# Patient Record
Sex: MALE | Race: WHITE | Employment: STUDENT | URBAN - NONMETROPOLITAN AREA
[De-identification: names, ages, dates, MRNs, and addresses within clinical notes are randomized per-mention and may not be internally consistent; named-entity substitution may affect disease eponyms.]

---

## 2018-10-31 ENCOUNTER — OFFICE VISIT (OUTPATIENT)
Dept: FAMILY MEDICINE CLINIC | Age: 23
End: 2018-10-31
Payer: COMMERCIAL

## 2018-10-31 VITALS
TEMPERATURE: 100 F | WEIGHT: 170 LBS | BODY MASS INDEX: 24.74 KG/M2 | OXYGEN SATURATION: 96 % | DIASTOLIC BLOOD PRESSURE: 60 MMHG | SYSTOLIC BLOOD PRESSURE: 100 MMHG | RESPIRATION RATE: 16 BRPM | HEART RATE: 111 BPM

## 2018-10-31 DIAGNOSIS — J02.9 ACUTE PHARYNGITIS, UNSPECIFIED ETIOLOGY: Primary | ICD-10-CM

## 2018-10-31 LAB — S PYO AG THROAT QL: NORMAL

## 2018-10-31 PROCEDURE — 99202 OFFICE O/P NEW SF 15 MIN: CPT | Performed by: NURSE PRACTITIONER

## 2018-10-31 PROCEDURE — G8484 FLU IMMUNIZE NO ADMIN: HCPCS | Performed by: NURSE PRACTITIONER

## 2018-10-31 PROCEDURE — 4004F PT TOBACCO SCREEN RCVD TLK: CPT | Performed by: NURSE PRACTITIONER

## 2018-10-31 PROCEDURE — 87880 STREP A ASSAY W/OPTIC: CPT | Performed by: NURSE PRACTITIONER

## 2018-10-31 PROCEDURE — G8427 DOCREV CUR MEDS BY ELIG CLIN: HCPCS | Performed by: NURSE PRACTITIONER

## 2018-10-31 PROCEDURE — G8420 CALC BMI NORM PARAMETERS: HCPCS | Performed by: NURSE PRACTITIONER

## 2018-10-31 RX ORDER — DEXAMETHASONE 4 MG/1
8 TABLET ORAL ONCE
Qty: 2 TABLET | Refills: 0 | Status: SHIPPED | OUTPATIENT
Start: 2018-10-31 | End: 2018-10-31

## 2018-10-31 RX ORDER — CEPHALEXIN 500 MG/1
500 CAPSULE ORAL 3 TIMES DAILY
Qty: 30 CAPSULE | Refills: 0 | Status: SHIPPED | OUTPATIENT
Start: 2018-10-31 | End: 2018-11-10

## 2018-10-31 RX ORDER — IBUPROFEN 800 MG/1
800 TABLET ORAL EVERY 8 HOURS PRN
Qty: 90 TABLET | Refills: 0 | Status: SHIPPED | OUTPATIENT
Start: 2018-10-31

## 2018-10-31 ASSESSMENT — ENCOUNTER SYMPTOMS
SWOLLEN GLANDS: 1
SORE THROAT: 1

## 2021-08-11 DIAGNOSIS — A74.9 CHLAMYDIA: Primary | ICD-10-CM

## 2021-08-11 RX ORDER — AZITHROMYCIN 500 MG/1
1000 TABLET, FILM COATED ORAL DAILY
Qty: 2 TABLET | Refills: 0 | Status: SHIPPED | OUTPATIENT
Start: 2021-08-11 | End: 2021-08-12

## 2022-09-15 ENCOUNTER — HOSPITAL ENCOUNTER (EMERGENCY)
Age: 27
Discharge: HOME | End: 2022-09-15
Attending: EMERGENCY MEDICINE
Payer: MEDICAID

## 2022-09-15 ENCOUNTER — APPOINTMENT (OUTPATIENT)
Dept: GENERAL RADIOLOGY | Age: 27
End: 2022-09-15
Payer: MEDICAID

## 2022-09-15 VITALS
RESPIRATION RATE: 20 BRPM | OXYGEN SATURATION: 97 % | SYSTOLIC BLOOD PRESSURE: 132 MMHG | WEIGHT: 220 LBS | DIASTOLIC BLOOD PRESSURE: 58 MMHG | TEMPERATURE: 96.9 F | BODY MASS INDEX: 32.58 KG/M2 | HEART RATE: 82 BPM | HEIGHT: 69 IN

## 2022-09-15 DIAGNOSIS — J06.9 ACUTE UPPER RESPIRATORY INFECTION: ICD-10-CM

## 2022-09-15 DIAGNOSIS — J45.901 EXACERBATION OF ASTHMA, UNSPECIFIED ASTHMA SEVERITY, UNSPECIFIED WHETHER PERSISTENT: Primary | ICD-10-CM

## 2022-09-15 LAB
SARS-COV-2, RAPID: NOT DETECTED
SPECIMEN DESCRIPTION: NORMAL

## 2022-09-15 PROCEDURE — 94664 DEMO&/EVAL PT USE INHALER: CPT

## 2022-09-15 PROCEDURE — 99284 EMERGENCY DEPT VISIT MOD MDM: CPT

## 2022-09-15 PROCEDURE — 71045 X-RAY EXAM CHEST 1 VIEW: CPT

## 2022-09-15 PROCEDURE — 6370000000 HC RX 637 (ALT 250 FOR IP): Performed by: EMERGENCY MEDICINE

## 2022-09-15 PROCEDURE — C9803 HOPD COVID-19 SPEC COLLECT: HCPCS

## 2022-09-15 PROCEDURE — 87635 SARS-COV-2 COVID-19 AMP PRB: CPT

## 2022-09-15 RX ORDER — ONDANSETRON 4 MG/1
4 TABLET, ORALLY DISINTEGRATING ORAL EVERY 8 HOURS PRN
Qty: 10 TABLET | Refills: 0 | Status: SHIPPED | OUTPATIENT
Start: 2022-09-15

## 2022-09-15 RX ORDER — PREDNISONE 50 MG/1
50 TABLET ORAL DAILY
Qty: 4 TABLET | Refills: 0 | Status: SHIPPED | OUTPATIENT
Start: 2022-09-15 | End: 2022-09-20

## 2022-09-15 RX ORDER — IPRATROPIUM BROMIDE AND ALBUTEROL SULFATE 2.5; .5 MG/3ML; MG/3ML
1 SOLUTION RESPIRATORY (INHALATION) ONCE
Status: COMPLETED | OUTPATIENT
Start: 2022-09-15 | End: 2022-09-15

## 2022-09-15 RX ORDER — ALBUTEROL SULFATE 90 UG/1
2 AEROSOL, METERED RESPIRATORY (INHALATION) 4 TIMES DAILY PRN
Qty: 54 G | Refills: 1 | Status: SHIPPED | OUTPATIENT
Start: 2022-09-15

## 2022-09-15 RX ORDER — AZITHROMYCIN 250 MG/1
TABLET, FILM COATED ORAL
Qty: 1 PACKET | Refills: 0 | Status: SHIPPED | OUTPATIENT
Start: 2022-09-15 | End: 2022-09-19

## 2022-09-15 RX ADMIN — IPRATROPIUM BROMIDE AND ALBUTEROL SULFATE 1 AMPULE: 2.5; .5 SOLUTION RESPIRATORY (INHALATION) at 11:07

## 2022-09-15 NOTE — DISCHARGE INSTRUCTIONS
Tylenol as needed for any fevers. Try an over-the-counter antihistamine as needed for symptoms of allergies. Prednisone as directed until complete. Z-Jl as directed until complete. Use albuterol 2 puffs every 4 hours as needed for wheezing or bronchospasms. Follow-up with primary care provider soon as possible for routine evaluation.   Seek medical attention immediately for any acute concerns

## 2022-09-15 NOTE — Clinical Note
Montana Ahn was seen and treated in our emergency department on 9/15/2022. He may return to work on 09/19/2022. If you have any questions or concerns, please don't hesitate to call.       Damien Gary MD

## 2022-09-15 NOTE — ED PROVIDER NOTES
[facility] ED  EMERGENCY DEPARTMENT ENCOUNTER      Pt Name: Maya Russo  MRN: [id number]  [place] 1995  Date of evaluation: 9/15/2022  Provider: Ray Christianson MD    CHIEF COMPLAINT       Chief Complaint   Patient presents with    Cough     Productive, onset 1 week ago      Emesis     Onset 1-2 weeks ago. HISTORY OF PRESENT ILLNESS   (Location/Symptom, Timing/Onset, Context/Setting, Quality, Duration, Modifying Factors, Severity)  Note limiting factors. Maya Russo is a 28 y.o. male who presents to the emergency department      28year-old male presents emergency department for evaluation of shortness of breath and cough. Patient is also had a few episodes of vomiting after coughing spells. Have a fever a few days ago. No known sick contacts. Patient does have a history of asthma. Has not seen a primary care provider for quite some time so does not have an inhaler. He is complaining of nasal congestion and drainage as well. Cough has been productive of yellow sputum. He has had some nausea but has been triggered after coughing spells. Denies any abdominal pain. Has been vaccinated for COVID-19. Denies any other acute concerns. Nursing Notes were reviewed. REVIEW OF SYSTEMS    (2-9 systems for level 4, 10 or more for level 5)     Review of Systems   All other systems reviewed and are negative. Except as noted above the remainder of the review of systems was reviewed and negative.        PAST MEDICAL HISTORY     Past Medical History:   Diagnosis Date    Acne     ADHD (attention deficit hyperactivity disorder)     Asthma     History of pneumonia     History of seasonal allergies          SURGICAL HISTORY       Past Surgical History:   Procedure Laterality Date    APPENDECTOMY  2004    REL OF TONGUE TIE AND CLOSURE WITH FLAP           CURRENT MEDICATIONS       Discharge Medication List as of 9/15/2022 11:30 AM        CONTINUE these medications which have NOT CHANGED    Details   ibuprofen (ADVIL;MOTRIN) 800 MG tablet Take 1 tablet by mouth every 8 hours as needed for Pain (with food), Disp-90 tablet, R-0Normal      methylphenidate (CONCERTA) 36 MG extended release tablet Take 36 mg by mouth every morning. Historical Med             ALLERGIES     Patient has no known allergies. FAMILY HISTORY       Family History   Problem Relation Age of Onset    Diabetes Mother         GESTATIONAL    Cancer Maternal Grandmother         SKIN          SOCIAL HISTORY       Social History     Socioeconomic History    Marital status: Single     Spouse name: None    Number of children: None    Years of education: None    Highest education level: None   Tobacco Use    Smoking status: Every Day     Packs/day: 0.50     Types: Cigarettes    Smokeless tobacco: Current     Types: Chew   Vaping Use    Vaping Use: Some days   Substance and Sexual Activity    Alcohol use: No       SCREENINGS        Donny Coma Scale  Eye Opening: Spontaneous  Best Verbal Response: Oriented  Best Motor Response: Obeys commands  Donny Coma Scale Score: 15               PHYSICAL EXAM    (up to 7 for level 4, 8 or more for level 5)     ED Triage Vitals [09/15/22 0907]   BP Temp Temp Source Heart Rate Resp SpO2 Height Weight   117/71 96.9 °F (36.1 °C) Tympanic 82 -- 97 % 5' 9\" (1.753 m) 220 lb (99.8 kg)       Physical Exam  Vitals and nursing note reviewed. HENT:      Head: Normocephalic and atraumatic. Cardiovascular:      Rate and Rhythm: Normal rate and regular rhythm. Pulmonary:      Effort: Pulmonary effort is normal.      Comments: Poor air exchange. Diminished Breath sounds bilaterally. Diffuse wheezing. Abdominal:      General: There is no distension. Palpations: Abdomen is soft. Tenderness: There is no abdominal tenderness. Skin:     General: Skin is warm and dry. Findings: No rash. Neurological:      General: No focal deficit present.       Mental Status: He is alert and oriented to person, place, and time. DIAGNOSTIC RESULTS     EKG: All EKG's are interpreted by the Emergency Department Physician who either signs or Co-signs this chart in the absence of a cardiologist.        RADIOLOGY:   Non-plain film images such as CT, Ultrasound and MRI are read by the radiologist. Plain radiographic images are visualized and preliminarily interpreted by the emergency physician with the below findings:        Interpretation per the Radiologist below, if available at the time of this note:    XR CHEST PORTABLE   Final Result   No acute process. No focal consolidation. ED BEDSIDE ULTRASOUND:   Performed by ED Physician - none    LABS:  Labs Reviewed   COVID-19, RAPID       All other labs were within normal range or not returned as of this dictation. EMERGENCY DEPARTMENT COURSE and DIFFERENTIAL DIAGNOSIS/MDM:   Vitals:    Vitals:    09/15/22 0959 09/15/22 1029 09/15/22 1059 09/15/22 1129   BP: 110/60 115/69 133/81 (!) 132/58   Pulse:       Resp:       Temp:       TempSrc:       SpO2: 95% 98% 95% 97%   Weight:       Height:               MDM  Number of Diagnoses or Management Options  Acute upper respiratory infection  Exacerbation of asthma, unspecified asthma severity, unspecified whether persistent  Diagnosis management comments: 26-year-old male wheezing. Improved with treatment. Covid 19 rapid antigen negative. CXR unremarkable. Stable for discharge home. ED return and follow up discussed prior to discharge. [facility]       REASSESSMENT          CRITICAL CARE TIME   Total Critical Care time was  minutes, excluding separately reportable procedures. There was a high probability of clinically significant/life threatening deterioration in the patient's condition which required my urgent intervention. CONSULTS:  None    PROCEDURES:  Unless otherwise noted below, none     Procedures        FINAL IMPRESSION      1.  Exacerbation of asthma, unspecified asthma severity, unspecified whether persistent    2. Acute upper respiratory infection          DISPOSITION/PLAN   DISPOSITION Decision To Discharge 09/15/2022 11:26:48 AM      PATIENT REFERRED TO:  [address] [address]        DISCHARGE MEDICATIONS:  Discharge Medication List as of 9/15/2022 11:30 AM        START taking these medications    Details   albuterol sulfate HFA (VENTOLIN HFA) 108 (90 Base) MCG/ACT inhaler Inhale 2 puffs into the lungs 4 times daily as needed for Wheezing, Disp-54 g, R-1Normal      predniSONE (DELTASONE) 50 MG tablet Take 1 tablet by mouth daily for 5 days, Disp-4 tablet, R-0Normal      azithromycin (ZITHROMAX Z-PILY) 250 MG tablet Take 2 tablets (500 mg) on Day 1, and then take 1 tablet (250 mg) on days 2 through 5., Disp-1 packet, R-0Normal      ondansetron (ZOFRAN ODT) 4 MG disintegrating tablet Take 1 tablet by mouth every 8 hours as needed for Nausea or Vomiting, Disp-10 tablet, R-0Normal           Controlled Substances Monitoring:     No flowsheet data found.     (Please note that portions of this note were completed with a voice recognition program.  Efforts were made to edit the dictations but occasionally words are mis-transcribed.)    Valeriano Marley MD (electronically signed)  Attending Emergency Physician             Valeriano Marley MD  09/25/22 [phone]

## 2022-09-15 NOTE — ED NOTES
Patient given information on 2 offices taking new patients [id number] and Dr. Benny Black office for possible follow up.      Dorothy Espinoza RN  09/15/22 [id number]

## 2023-10-25 ENCOUNTER — HOSPITAL ENCOUNTER (EMERGENCY)
Age: 28
Discharge: HOME | End: 2023-10-26
Attending: EMERGENCY MEDICINE

## 2023-10-25 DIAGNOSIS — Z87.09 HISTORY OF ASTHMA: ICD-10-CM

## 2023-10-25 DIAGNOSIS — R11.2 NAUSEA AND VOMITING IN ADULT: Primary | ICD-10-CM

## 2023-10-25 DIAGNOSIS — Z86.59 HISTORY OF ADHD: ICD-10-CM

## 2023-10-25 PROCEDURE — 96375 TX/PRO/DX INJ NEW DRUG ADDON: CPT

## 2023-10-25 PROCEDURE — 96374 THER/PROPH/DIAG INJ IV PUSH: CPT

## 2023-10-25 PROCEDURE — 99284 EMERGENCY DEPT VISIT MOD MDM: CPT

## 2023-10-25 RX ORDER — 0.9 % SODIUM CHLORIDE 0.9 %
1000 INTRAVENOUS SOLUTION INTRAVENOUS ONCE
Status: COMPLETED | OUTPATIENT
Start: 2023-10-26 | End: 2023-10-26

## 2023-10-25 RX ORDER — ONDANSETRON 2 MG/ML
4 INJECTION INTRAMUSCULAR; INTRAVENOUS ONCE
Status: COMPLETED | OUTPATIENT
Start: 2023-10-26 | End: 2023-10-26

## 2023-10-25 ASSESSMENT — PAIN SCALES - GENERAL: PAINLEVEL_OUTOF10: 6

## 2023-10-25 ASSESSMENT — PAIN DESCRIPTION - PAIN TYPE: TYPE: ACUTE PAIN

## 2023-10-25 ASSESSMENT — PAIN DESCRIPTION - LOCATION: LOCATION: ABDOMEN

## 2023-10-25 ASSESSMENT — PAIN DESCRIPTION - DESCRIPTORS: DESCRIPTORS: DISCOMFORT

## 2023-10-25 ASSESSMENT — PAIN DESCRIPTION - FREQUENCY: FREQUENCY: INTERMITTENT

## 2023-10-25 ASSESSMENT — PAIN - FUNCTIONAL ASSESSMENT: PAIN_FUNCTIONAL_ASSESSMENT: 0-10

## 2023-10-25 ASSESSMENT — PAIN DESCRIPTION - ORIENTATION: ORIENTATION: RIGHT;LOWER;UPPER

## 2023-10-26 VITALS
SYSTOLIC BLOOD PRESSURE: 105 MMHG | HEIGHT: 69 IN | DIASTOLIC BLOOD PRESSURE: 62 MMHG | TEMPERATURE: 98.1 F | OXYGEN SATURATION: 94 % | WEIGHT: 240 LBS | HEART RATE: 96 BPM | RESPIRATION RATE: 20 BRPM | BODY MASS INDEX: 35.55 KG/M2

## 2023-10-26 LAB
ALBUMIN SERPL-MCNC: 3.7 G/DL (ref 3.5–5.2)
ALBUMIN/GLOB SERPL: 1.9 {RATIO} (ref 1–2.5)
ALP SERPL-CCNC: 64 U/L (ref 40–129)
ALT SERPL-CCNC: 22 U/L (ref 5–41)
ANION GAP SERPL CALCULATED.3IONS-SCNC: 9 MMOL/L (ref 9–17)
AST SERPL-CCNC: 18 U/L
BASOPHILS # BLD: 0.04 K/UL (ref 0–0.2)
BASOPHILS NFR BLD: 1 % (ref 0–2)
BILIRUB DIRECT SERPL-MCNC: <0.1 MG/DL
BILIRUB INDIRECT SERPL-MCNC: ABNORMAL MG/DL (ref 0–1)
BILIRUB SERPL-MCNC: 0.3 MG/DL (ref 0.3–1.2)
BUN SERPL-MCNC: 17 MG/DL (ref 6–20)
BUN/CREAT SERPL: 13 (ref 9–20)
CALCIUM SERPL-MCNC: 8.9 MG/DL (ref 8.6–10.4)
CHLORIDE SERPL-SCNC: 105 MMOL/L (ref 98–107)
CO2 SERPL-SCNC: 27 MMOL/L (ref 20–31)
CREAT SERPL-MCNC: 1.3 MG/DL (ref 0.7–1.2)
EOSINOPHIL # BLD: 0.27 K/UL (ref 0–0.44)
EOSINOPHILS RELATIVE PERCENT: 4 % (ref 1–4)
ERYTHROCYTE [DISTWIDTH] IN BLOOD BY AUTOMATED COUNT: 12.5 % (ref 11.8–14.4)
GFR SERPL CREATININE-BSD FRML MDRD: >60 ML/MIN/1.73M2
GLUCOSE SERPL-MCNC: 114 MG/DL (ref 70–99)
HCT VFR BLD AUTO: 45.3 % (ref 40.7–50.3)
HGB BLD-MCNC: 15.5 G/DL (ref 13–17)
IMM GRANULOCYTES # BLD AUTO: <0.03 K/UL (ref 0–0.3)
IMM GRANULOCYTES NFR BLD: 0 %
LIPASE SERPL-CCNC: 65 U/L (ref 13–60)
LYMPHOCYTES NFR BLD: 1.9 K/UL (ref 1.1–3.7)
LYMPHOCYTES RELATIVE PERCENT: 26 % (ref 24–43)
MCH RBC QN AUTO: 30.3 PG (ref 25.2–33.5)
MCHC RBC AUTO-ENTMCNC: 34.2 G/DL (ref 28.4–34.8)
MCV RBC AUTO: 88.6 FL (ref 82.6–102.9)
MONOCYTES NFR BLD: 0.48 K/UL (ref 0.1–1.2)
MONOCYTES NFR BLD: 7 % (ref 3–12)
NEUTROPHILS NFR BLD: 62 % (ref 36–65)
NEUTS SEG NFR BLD: 4.58 K/UL (ref 1.5–8.1)
NRBC BLD-RTO: 0 PER 100 WBC
PLATELET # BLD AUTO: 200 K/UL (ref 138–453)
PMV BLD AUTO: 10 FL (ref 8.1–13.5)
POTASSIUM SERPL-SCNC: 3.6 MMOL/L (ref 3.7–5.3)
PROT SERPL-MCNC: 5.7 G/DL (ref 6.4–8.3)
RBC # BLD AUTO: 5.11 M/UL (ref 4.21–5.77)
SODIUM SERPL-SCNC: 141 MMOL/L (ref 135–144)
WBC OTHER # BLD: 7.3 K/UL (ref 3.5–11.3)

## 2023-10-26 PROCEDURE — 2580000003 HC RX 258: Performed by: EMERGENCY MEDICINE

## 2023-10-26 PROCEDURE — 80076 HEPATIC FUNCTION PANEL: CPT

## 2023-10-26 PROCEDURE — 80048 BASIC METABOLIC PNL TOTAL CA: CPT

## 2023-10-26 PROCEDURE — 83690 ASSAY OF LIPASE: CPT

## 2023-10-26 PROCEDURE — 6360000002 HC RX W HCPCS: Performed by: EMERGENCY MEDICINE

## 2023-10-26 PROCEDURE — 36415 COLL VENOUS BLD VENIPUNCTURE: CPT

## 2023-10-26 PROCEDURE — 96375 TX/PRO/DX INJ NEW DRUG ADDON: CPT

## 2023-10-26 PROCEDURE — 2500000003 HC RX 250 WO HCPCS: Performed by: EMERGENCY MEDICINE

## 2023-10-26 PROCEDURE — A4216 STERILE WATER/SALINE, 10 ML: HCPCS | Performed by: EMERGENCY MEDICINE

## 2023-10-26 PROCEDURE — 85025 COMPLETE CBC W/AUTO DIFF WBC: CPT

## 2023-10-26 PROCEDURE — 96374 THER/PROPH/DIAG INJ IV PUSH: CPT

## 2023-10-26 RX ORDER — FAMOTIDINE 20 MG/1
20 TABLET, FILM COATED ORAL 2 TIMES DAILY
Qty: 60 TABLET | Refills: 0 | Status: SHIPPED | OUTPATIENT
Start: 2023-10-26

## 2023-10-26 RX ORDER — ONDANSETRON 4 MG/1
4 TABLET, ORALLY DISINTEGRATING ORAL 3 TIMES DAILY PRN
Qty: 21 TABLET | Refills: 0 | Status: SHIPPED | OUTPATIENT
Start: 2023-10-26

## 2023-10-26 RX ADMIN — SODIUM CHLORIDE 1000 ML: 9 INJECTION, SOLUTION INTRAVENOUS at 00:22

## 2023-10-26 RX ADMIN — FAMOTIDINE 20 MG: 10 INJECTION, SOLUTION INTRAVENOUS at 00:24

## 2023-10-26 RX ADMIN — ONDANSETRON 4 MG: 2 INJECTION INTRAMUSCULAR; INTRAVENOUS at 00:23

## 2023-10-26 ASSESSMENT — ENCOUNTER SYMPTOMS
VOMITING: 1
SORE THROAT: 0
DIARRHEA: 1
ABDOMINAL PAIN: 1
COUGH: 0
NAUSEA: 1
COLOR CHANGE: 0
SHORTNESS OF BREATH: 0

## 2023-10-26 NOTE — ED PROVIDER NOTES
chloride (PF) 0.9 % 10 mL injection (20 mg IntraVENous Given 10/26/23 0024)       MDM  . Patient presented to the ER with stable vitals and a soft nonsurgical abdomen. Differential diagnosis is for viral gastroenteritis versus GERD/gastritis versus biliary colic versus pancreatitis. Basic labs were obtained secondary to this and show no clinically significant findings. Patient was given IV fluid Zofran and Pepcid and had no bouts of vomiting while in the ER. He was able to fall asleep during the work-up and on repeat evaluation his abdomen remained soft and nonsurgical.  Therefore at this time with work-up revealing no clinically significant findings and patient having no further bouts of vomiting and his symptoms controlled in the ER I do not feel there is need for further work-up and he is otherwise safe for discharge with symptomatic care    REVAL:         CRITICAL CARE TIME   Total Critical Care time was 0 minutes, excluding separately reportable procedures. There was a high probability of clinically significant/life threatening deterioration in the patient's condition which required my urgent intervention. CONSULTS:  None    PROCEDURES:  Unless otherwise noted below, none     Procedures    FINAL IMPRESSION      1. Nausea and vomiting in adult    2. History of ADHD    3.  History of asthma          DISPOSITION/PLAN   DISPOSITION Decision To Discharge 10/26/2023 01:27:13 AM      PATIENT REFERRED TO:  [address]  [phone]  Schedule an appointment as soon as possible for a visit in 1 week  If symptoms worsen      DISCHARGE MEDICATIONS:  Discharge Medication List as of 10/26/2023  1:29 AM        START taking these medications    Details   !! ondansetron (ZOFRAN-ODT) 4 MG disintegrating tablet Take 1 tablet by mouth 3 times daily as needed for Nausea or Vomiting, Disp-21 tablet, R-0Normal      famotidine (PEPCID) 20 MG tablet Take 1 tablet by

## 2024-11-19 ENCOUNTER — HOSPITAL ENCOUNTER (EMERGENCY)
Age: 29
Discharge: HOME | End: 2024-11-19
Payer: COMMERCIAL

## 2024-11-19 ENCOUNTER — APPOINTMENT (OUTPATIENT)
Dept: CT IMAGING | Age: 29
End: 2024-11-19
Payer: COMMERCIAL

## 2024-11-19 VITALS
TEMPERATURE: 97.8 F | HEART RATE: 87 BPM | RESPIRATION RATE: 16 BRPM | DIASTOLIC BLOOD PRESSURE: 84 MMHG | SYSTOLIC BLOOD PRESSURE: 121 MMHG | OXYGEN SATURATION: 96 %

## 2024-11-19 DIAGNOSIS — R11.2 NAUSEA AND VOMITING, UNSPECIFIED VOMITING TYPE: Primary | ICD-10-CM

## 2024-11-19 LAB
ALBUMIN SERPL-MCNC: 4.1 G/DL (ref 3.5–5.2)
ALBUMIN/GLOB SERPL: 1.5 {RATIO} (ref 1–2.5)
ALP SERPL-CCNC: 89 U/L (ref 40–129)
ALT SERPL-CCNC: 26 U/L (ref 10–50)
ANION GAP SERPL CALCULATED.3IONS-SCNC: 9 MMOL/L (ref 9–16)
AST SERPL-CCNC: 21 U/L (ref 10–50)
BASOPHILS # BLD: <0.03 K/UL (ref 0–0.2)
BASOPHILS NFR BLD: 1 % (ref 0–2)
BILIRUB SERPL-MCNC: 0.4 MG/DL (ref 0–1.2)
BILIRUB UR QL STRIP: NEGATIVE
BUN SERPL-MCNC: 14 MG/DL (ref 6–20)
BUN/CREAT SERPL: 16 (ref 9–20)
CALCIUM SERPL-MCNC: 8.9 MG/DL (ref 8.6–10.4)
CHLORIDE SERPL-SCNC: 106 MMOL/L (ref 98–107)
CLARITY UR: CLEAR
CO2 SERPL-SCNC: 25 MMOL/L (ref 20–31)
COLOR UR: YELLOW
CREAT SERPL-MCNC: 0.9 MG/DL (ref 0.7–1.2)
EOSINOPHIL # BLD: 0.16 K/UL (ref 0–0.44)
EOSINOPHILS RELATIVE PERCENT: 4 % (ref 1–4)
EPI CELLS #/AREA URNS HPF: ABNORMAL /HPF (ref 0–5)
ERYTHROCYTE [DISTWIDTH] IN BLOOD BY AUTOMATED COUNT: 12.6 % (ref 11.8–14.4)
GFR, ESTIMATED: >90 ML/MIN/1.73M2
GLUCOSE SERPL-MCNC: 100 MG/DL (ref 74–99)
GLUCOSE UR STRIP-MCNC: NEGATIVE MG/DL
HCT VFR BLD AUTO: 45.7 % (ref 40.7–50.3)
HGB BLD-MCNC: 15.8 G/DL (ref 13–17)
HGB UR QL STRIP.AUTO: NEGATIVE
IMM GRANULOCYTES # BLD AUTO: <0.03 K/UL (ref 0–0.3)
IMM GRANULOCYTES NFR BLD: 0 %
KETONES UR STRIP-MCNC: NEGATIVE MG/DL
LEUKOCYTE ESTERASE UR QL STRIP: NEGATIVE
LIPASE SERPL-CCNC: 55 U/L (ref 13–60)
LYMPHOCYTES NFR BLD: 0.92 K/UL (ref 1.1–3.7)
LYMPHOCYTES RELATIVE PERCENT: 21 % (ref 24–43)
MCH RBC QN AUTO: 30.3 PG (ref 25.2–33.5)
MCHC RBC AUTO-ENTMCNC: 34.6 G/DL (ref 28.4–34.8)
MCV RBC AUTO: 87.7 FL (ref 82.6–102.9)
MONOCYTES NFR BLD: 0.4 K/UL (ref 0.1–1.2)
MONOCYTES NFR BLD: 9 % (ref 3–12)
MUCOUS THREADS URNS QL MICRO: ABNORMAL
NEUTROPHILS NFR BLD: 65 % (ref 36–65)
NEUTS SEG NFR BLD: 2.85 K/UL (ref 1.5–8.1)
NITRITE UR QL STRIP: NEGATIVE
NRBC BLD-RTO: 0 PER 100 WBC
PH UR STRIP: 6.5 [PH] (ref 5–9)
PLATELET # BLD AUTO: 190 K/UL (ref 138–453)
PMV BLD AUTO: 10.5 FL (ref 8.1–13.5)
POTASSIUM SERPL-SCNC: 4.1 MMOL/L (ref 3.7–5.3)
PROT SERPL-MCNC: 6.8 G/DL (ref 6.6–8.7)
PROT UR STRIP-MCNC: NEGATIVE MG/DL
RBC # BLD AUTO: 5.21 M/UL (ref 4.21–5.77)
RBC #/AREA URNS HPF: ABNORMAL /HPF (ref 0–2)
SODIUM SERPL-SCNC: 140 MMOL/L (ref 136–145)
SP GR UR STRIP: 1.02 (ref 1.01–1.02)
UROBILINOGEN UR STRIP-ACNC: NORMAL EU/DL (ref 0–1)
WBC #/AREA URNS HPF: ABNORMAL /HPF (ref 0–5)
WBC OTHER # BLD: 4.4 K/UL (ref 3.5–11.3)

## 2024-11-19 PROCEDURE — 81001 URINALYSIS AUTO W/SCOPE: CPT

## 2024-11-19 PROCEDURE — 85025 COMPLETE CBC W/AUTO DIFF WBC: CPT

## 2024-11-19 PROCEDURE — 96374 THER/PROPH/DIAG INJ IV PUSH: CPT

## 2024-11-19 PROCEDURE — 6360000004 HC RX CONTRAST MEDICATION: Performed by: PHYSICIAN ASSISTANT

## 2024-11-19 PROCEDURE — 83690 ASSAY OF LIPASE: CPT

## 2024-11-19 PROCEDURE — 74177 CT ABD & PELVIS W/CONTRAST: CPT

## 2024-11-19 PROCEDURE — 99285 EMERGENCY DEPT VISIT HI MDM: CPT

## 2024-11-19 PROCEDURE — 2580000003 HC RX 258: Performed by: PHYSICIAN ASSISTANT

## 2024-11-19 PROCEDURE — 6360000002 HC RX W HCPCS: Performed by: PHYSICIAN ASSISTANT

## 2024-11-19 PROCEDURE — 96361 HYDRATE IV INFUSION ADD-ON: CPT

## 2024-11-19 PROCEDURE — 80053 COMPREHEN METABOLIC PANEL: CPT

## 2024-11-19 RX ORDER — IOPAMIDOL 755 MG/ML
75 INJECTION, SOLUTION INTRAVASCULAR
Status: COMPLETED | OUTPATIENT
Start: 2024-11-19 | End: 2024-11-19

## 2024-11-19 RX ORDER — 0.9 % SODIUM CHLORIDE 0.9 %
1000 INTRAVENOUS SOLUTION INTRAVENOUS ONCE
Status: COMPLETED | OUTPATIENT
Start: 2024-11-19 | End: 2024-11-19

## 2024-11-19 RX ORDER — ONDANSETRON 2 MG/ML
4 INJECTION INTRAMUSCULAR; INTRAVENOUS ONCE
Status: COMPLETED | OUTPATIENT
Start: 2024-11-19 | End: 2024-11-19

## 2024-11-19 RX ORDER — ONDANSETRON 4 MG/1
4 TABLET, ORALLY DISINTEGRATING ORAL 3 TIMES DAILY PRN
Qty: 12 TABLET | Refills: 0 | Status: SHIPPED | OUTPATIENT
Start: 2024-11-19

## 2024-11-19 RX ORDER — SODIUM CHLORIDE 0.9 % (FLUSH) 0.9 %
3 SYRINGE (ML) INJECTION EVERY 8 HOURS
Status: DISCONTINUED | OUTPATIENT
Start: 2024-11-19 | End: 2024-11-19 | Stop reason: HOSPADM

## 2024-11-19 RX ADMIN — ONDANSETRON 4 MG: 2 INJECTION, SOLUTION INTRAMUSCULAR; INTRAVENOUS at 14:47

## 2024-11-19 RX ADMIN — IOPAMIDOL 75 ML: 755 INJECTION, SOLUTION INTRAVENOUS at 15:38

## 2024-11-19 RX ADMIN — SODIUM CHLORIDE 1000 ML: 9 INJECTION, SOLUTION INTRAVENOUS at 14:44

## 2024-11-19 RX ADMIN — SODIUM CHLORIDE, PRESERVATIVE FREE 3 ML: 5 INJECTION INTRAVENOUS at 14:44

## 2024-11-19 ASSESSMENT — PAIN DESCRIPTION - PAIN TYPE: TYPE: ACUTE PAIN

## 2024-11-19 ASSESSMENT — PAIN - FUNCTIONAL ASSESSMENT: PAIN_FUNCTIONAL_ASSESSMENT: 0-10

## 2024-11-19 ASSESSMENT — ENCOUNTER SYMPTOMS
NAUSEA: 1
ABDOMINAL PAIN: 0
SHORTNESS OF BREATH: 0
DIARRHEA: 0
VOMITING: 1
COUGH: 0

## 2024-11-19 ASSESSMENT — LIFESTYLE VARIABLES
HOW OFTEN DO YOU HAVE A DRINK CONTAINING ALCOHOL: NEVER
HOW MANY STANDARD DRINKS CONTAINING ALCOHOL DO YOU HAVE ON A TYPICAL DAY: PATIENT DOES NOT DRINK

## 2024-11-19 ASSESSMENT — PAIN DESCRIPTION - DESCRIPTORS: DESCRIPTORS: SHARP

## 2024-11-19 ASSESSMENT — PAIN DESCRIPTION - ORIENTATION: ORIENTATION: LEFT;LOWER

## 2024-11-19 ASSESSMENT — PAIN SCALES - GENERAL: PAINLEVEL_OUTOF10: 3

## 2024-11-19 ASSESSMENT — PAIN DESCRIPTION - LOCATION: LOCATION: ABDOMEN

## 2024-11-19 NOTE — DISCHARGE INSTRUCTIONS
Follow a clear liquid diet for the next 12 hours and use Zofran as needed.  Slowly progress to a bland diet.  Use over-the-counter Imodium if needed for any diarrhea.

## 2024-11-19 NOTE — ED PROVIDER NOTES
[facility]  EMERGENCY DEPARTMENT ENCOUNTER      Pt Name: Rito Ruiz  MRN: [id number]  Birthdate 1995  Date of evaluation: 11/19/2024  Provider: Julia Landin PA-C    CHIEF COMPLAINT       Chief Complaint   Patient presents with    Vomiting     Complains of nausea,vomiting and abd pains that started today.         HISTORY OF PRESENT ILLNESS      Rito Ruiz is a 29 y.o. male who presents to the emergency department due to nausea vomiting.  Patient states he woke up this morning and felt nauseous.  He thought that he may be needed to eat and tried to eat and had vomiting.  He has also felt discomfort in his left lower abdomen.  No fever or chills.  No urinary symptoms.  No history of abdominal surgeries.        REVIEW OF SYSTEMS       Review of Systems   Respiratory:  Negative for cough and shortness of breath.    Cardiovascular:  Negative for chest pain.   Gastrointestinal:  Positive for nausea and vomiting. Negative for abdominal pain and diarrhea.         PAST MEDICAL HISTORY     Past Medical History:   Diagnosis Date    Acne     ADHD (attention deficit hyperactivity disorder)     Asthma     History of pneumonia     History of seasonal allergies          SURGICAL HISTORY       Past Surgical History:   Procedure Laterality Date    APPENDECTOMY  2004    REL OF TONGUE TIE AND CLOSURE WITH FLAP           CURRENT MEDICATIONS       Previous Medications    ALBUTEROL SULFATE HFA (VENTOLIN HFA) 108 (90 BASE) MCG/ACT INHALER    Inhale 2 puffs into the lungs 4 times daily as needed for Wheezing    FAMOTIDINE (PEPCID) 20 MG TABLET    Take 1 tablet by mouth 2 times daily    IBUPROFEN (ADVIL;MOTRIN) 800 MG TABLET    Take 1 tablet by mouth every 8 hours as needed for Pain (with food)    METHYLPHENIDATE (CONCERTA) 36 MG EXTENDED RELEASE TABLET    Take 36 mg by mouth every morning.       ALLERGIES       Patient has no known allergies.    FAMILY HISTORY       Family History   Problem Relation Age of Onset

## 2024-12-30 ENCOUNTER — HOSPITAL ENCOUNTER (EMERGENCY)
Age: 29
Discharge: HOME | End: 2024-12-30
Attending: EMERGENCY MEDICINE
Payer: COMMERCIAL

## 2024-12-30 VITALS
RESPIRATION RATE: 18 BRPM | OXYGEN SATURATION: 96 % | DIASTOLIC BLOOD PRESSURE: 80 MMHG | SYSTOLIC BLOOD PRESSURE: 117 MMHG | HEART RATE: 93 BPM | TEMPERATURE: 97.8 F

## 2024-12-30 DIAGNOSIS — J03.90 ACUTE TONSILLITIS, UNSPECIFIED ETIOLOGY: Primary | ICD-10-CM

## 2024-12-30 LAB
FLUAV AG SPEC QL: NEGATIVE
FLUBV AG SPEC QL: NEGATIVE
SARS-COV-2 RDRP RESP QL NAA+PROBE: NOT DETECTED
SPECIMEN DESCRIPTION: NORMAL
SPECIMEN SOURCE: NORMAL
STREP A, MOLECULAR: NEGATIVE

## 2024-12-30 PROCEDURE — 6360000002 HC RX W HCPCS: Performed by: PHYSICIAN ASSISTANT

## 2024-12-30 PROCEDURE — 99284 EMERGENCY DEPT VISIT MOD MDM: CPT

## 2024-12-30 PROCEDURE — 96372 THER/PROPH/DIAG INJ SC/IM: CPT

## 2024-12-30 PROCEDURE — 87651 STREP A DNA AMP PROBE: CPT

## 2024-12-30 PROCEDURE — 87804 INFLUENZA ASSAY W/OPTIC: CPT

## 2024-12-30 PROCEDURE — 87635 SARS-COV-2 COVID-19 AMP PRB: CPT

## 2024-12-30 RX ADMIN — PENICILLIN G BENZATHINE 1.2 MILLION UNITS: 1200000 INJECTION, SUSPENSION INTRAMUSCULAR at 11:25

## 2024-12-30 ASSESSMENT — PAIN SCALES - GENERAL: PAINLEVEL_OUTOF10: 5

## 2024-12-30 ASSESSMENT — PAIN DESCRIPTION - LOCATION: LOCATION: THROAT

## 2024-12-30 NOTE — ED PROVIDER NOTES
Emergency Department Encounter     Chief Complaint  Chief Complaint   Patient presents with    Sore Throat     Ongoing for past two days- chills, runny nose/congestion, and headache        HPI  29-year-old male states he feels like he has strep throat.  States he started with sore throat chills runny nose cough and headache but very sore throat last night and this morning he is able to swallow his secretions and denies any changes to his voice or feeling short of breath           Past Medical History  Past Medical History:   Diagnosis Date    Acne     ADHD (attention deficit hyperactivity disorder)     Asthma     History of pneumonia     History of seasonal allergies         Surgical History  Past Surgical History:   Procedure Laterality Date    APPENDECTOMY  2004    REL OF TONGUE TIE AND CLOSURE WITH FLAP          Current Medications  Current Outpatient Rx   Medication Sig Dispense Refill    ondansetron (ZOFRAN-ODT) 4 MG disintegrating tablet Take 1 tablet by mouth 3 times daily as needed for Nausea or Vomiting 12 tablet 0    famotidine (PEPCID) 20 MG tablet Take 1 tablet by mouth 2 times daily (Patient not taking: Reported on 11/19/2024) 60 tablet 0    albuterol sulfate HFA (VENTOLIN HFA) 108 (90 Base) MCG/ACT inhaler Inhale 2 puffs into the lungs 4 times daily as needed for Wheezing 54 g 1    ibuprofen (ADVIL;MOTRIN) 800 MG tablet Take 1 tablet by mouth every 8 hours as needed for Pain (with food) (Patient not taking: Reported on 10/25/2023) 90 tablet 0    methylphenidate (CONCERTA) 36 MG extended release tablet Take 36 mg by mouth every morning. (Patient not taking: Reported on 10/25/2023)          Allergies  No Known Allergies     Family History  Family History   Problem Relation Age of Onset    Diabetes Mother         GESTATIONAL    Cancer Maternal Grandmother         SKIN        Social History  Social History     Socioeconomic History    Marital status: Single     Spouse name: Not on file    Number of

## 2024-12-31 NOTE — ED PROVIDER NOTES
eMERGENCY dEPARTMENT eNCOUnter   Independent Attestation     Pt Name: Rito Ruiz  MRN: [id number]  Birthdate 1995  Date of evaluation: 12/31/24     Rito Ruiz is a 29 y.o. male with CC: Sore Throat (Ongoing for past two days- chills, runny nose/congestion, and headache)        This visit was performed by both a physician and an APC. I performed all aspects of the MDM as documented.      Madan Gerber MD  Attending Emergency Physician            Madan Gerber MD  12/31/24 1513

## 2025-01-16 ENCOUNTER — APPOINTMENT (OUTPATIENT)
Dept: GENERAL RADIOLOGY | Age: 30
End: 2025-01-16

## 2025-01-16 ENCOUNTER — HOSPITAL ENCOUNTER (EMERGENCY)
Age: 30
Discharge: HOME | End: 2025-01-16

## 2025-01-16 VITALS
DIASTOLIC BLOOD PRESSURE: 78 MMHG | OXYGEN SATURATION: 95 % | HEART RATE: 103 BPM | SYSTOLIC BLOOD PRESSURE: 123 MMHG | TEMPERATURE: 100.6 F | RESPIRATION RATE: 18 BRPM

## 2025-01-16 DIAGNOSIS — J06.9 ACUTE UPPER RESPIRATORY INFECTION: Primary | ICD-10-CM

## 2025-01-16 LAB
FLUAV AG SPEC QL: NEGATIVE
FLUBV AG SPEC QL: NEGATIVE
SARS-COV-2 RDRP RESP QL NAA+PROBE: NOT DETECTED
SPECIMEN DESCRIPTION: NORMAL

## 2025-01-16 PROCEDURE — 99284 EMERGENCY DEPT VISIT MOD MDM: CPT

## 2025-01-16 PROCEDURE — 6370000000 HC RX 637 (ALT 250 FOR IP)

## 2025-01-16 PROCEDURE — 87804 INFLUENZA ASSAY W/OPTIC: CPT

## 2025-01-16 PROCEDURE — 71045 X-RAY EXAM CHEST 1 VIEW: CPT

## 2025-01-16 PROCEDURE — 87635 SARS-COV-2 COVID-19 AMP PRB: CPT

## 2025-01-16 RX ORDER — ACETAMINOPHEN 500 MG
1000 TABLET ORAL ONCE
Status: COMPLETED | OUTPATIENT
Start: 2025-01-16 | End: 2025-01-16

## 2025-01-16 RX ORDER — BENZONATATE 100 MG/1
100 CAPSULE ORAL 3 TIMES DAILY PRN
Qty: 30 CAPSULE | Refills: 0 | Status: SHIPPED | OUTPATIENT
Start: 2025-01-16 | End: 2025-01-26

## 2025-01-16 RX ADMIN — ACETAMINOPHEN 1000 MG: 500 TABLET ORAL at 15:28

## 2025-01-16 ASSESSMENT — PAIN DESCRIPTION - LOCATION: LOCATION: GENERALIZED;THROAT

## 2025-01-16 ASSESSMENT — PAIN - FUNCTIONAL ASSESSMENT: PAIN_FUNCTIONAL_ASSESSMENT: 0-10

## 2025-01-16 NOTE — ED PROVIDER NOTES
[facility] EMERGENCY DEPARTMENT  EMERGENCY DEPARTMENT ENCOUNTER        Pt Name: Rito Ruiz  MRN: [id number]  Birthdate 1995  Date of evaluation: 1/16/2025  Provider: Tiffany Lanza MD  PCP: No primary care provider on file.  Note Started: 4:38 PM EST 1/16/25    CHIEF COMPLAINT       Chief Complaint   Patient presents with    Pharyngitis     Patient states he was treated for tonsillitis about a month ago.  He is continuing to have a sore throat and cough    Cough    Fever    Generalized Body Aches       HISTORY OF PRESENT ILLNESS: 1 or more Elements     Rito Ruiz is a 29 y.o. male who presents cough, congestion fever and bodyaches.  States that he was recently diagnosed with tonsillitis about a month ago and initially had some improvement after shot of antibiotics however symptoms have started to worsen over this past week.  Patient has not taken anything prior to arrival.  Denies any  n/v, headache, dizziness, vision changes, neck tenderness or stiffness, weakness, cp, palpitations, leg swelling/tenderness, sob, abd pain, dysuria, hematuria, diarrhea, constipation, bloody stools.    Nursing Notes were all reviewed and agreed with or any disagreements were addressed in the HPI.    ROS:   Pertinent positives and negatives are stated within HPI, all other systems reviewed and are negative.      --------------------------------------------- PAST HISTORY ---------------------------------------------  Past Medical History:  has a past medical history of Acne, ADHD (attention deficit hyperactivity disorder), Asthma, History of pneumonia, and History of seasonal allergies.    Past Surgical History:  has a past surgical history that includes Rel of Tongue Tie and Closure with Flap and Appendectomy (2004).    Social History:  reports that he has been smoking cigarettes. His smokeless tobacco use includes chew. He reports that he does not drink alcohol and does not use drugs.    Family History: family